# Patient Record
Sex: MALE | Race: WHITE | NOT HISPANIC OR LATINO | Employment: FULL TIME | URBAN - METROPOLITAN AREA
[De-identification: names, ages, dates, MRNs, and addresses within clinical notes are randomized per-mention and may not be internally consistent; named-entity substitution may affect disease eponyms.]

---

## 2017-11-22 ENCOUNTER — TRANSCRIBE ORDERS (OUTPATIENT)
Dept: NUTRITION | Facility: HOSPITAL | Age: 37
End: 2017-11-22

## 2017-11-22 DIAGNOSIS — E66.9 OBESITY, UNSPECIFIED CLASSIFICATION, UNSPECIFIED OBESITY TYPE, UNSPECIFIED WHETHER SERIOUS COMORBIDITY PRESENT: Primary | ICD-10-CM

## 2017-11-29 ENCOUNTER — HOSPITAL ENCOUNTER (OUTPATIENT)
Dept: NUTRITION | Facility: HOSPITAL | Age: 37
Setting detail: RECURRING SERIES
Discharge: HOME | End: 2017-11-29

## 2017-11-29 VITALS — WEIGHT: 288.8 LBS | HEIGHT: 71 IN | BODY MASS INDEX: 40.43 KG/M2

## 2017-11-29 PROCEDURE — 97802 MEDICAL NUTRITION INDIV IN: CPT

## 2018-01-02 ENCOUNTER — TELEPHONE (OUTPATIENT)
Dept: NUTRITION | Facility: HOSPITAL | Age: 38
End: 2018-01-02

## 2018-01-02 NOTE — PROGRESS NOTES
"Spoke with patient over the phone on progress since initial nutrition appointment approx. 1 month ago. Patient states he is doing better but not losing weight like he wants to. Says he has maintained his weight and states he is happy he did not gain weight during the holidays. Weight from initial appt is 288.8 pounds. Patient describes success with watching his calories and looking at the serving portions on nutrition labels. Patient states he has also been eating more at breakfast to help spread out his calories over the day. Patient is currently looking into a gym membership to help see more weight loss. Patient does not have any questions for RD today. Will call back when he is ready to schedule face-to-face follow up.     Goal completion:  1. Spread calories more evenly throughout the day by doing meal prep for breakfast and lunch, at least 3 days per week: 100%  2. Lose weight - .5# /2# per week: 0% (self report \"maintained\")    Patient has contact information and is encouraged to call as needed. Thank you for this referral.   "

## 2019-12-12 ENCOUNTER — HOSPITAL ENCOUNTER (OUTPATIENT)
Dept: GENERAL RADIOLOGY | Facility: HOSPITAL | Age: 39
Discharge: HOME | End: 2019-12-12
Admitting: INTERNAL MEDICINE

## 2019-12-12 ENCOUNTER — TRANSCRIBE ORDERS (OUTPATIENT)
Dept: ADMINISTRATIVE | Facility: HOSPITAL | Age: 39
End: 2019-12-12

## 2019-12-12 DIAGNOSIS — M54.50 LUMBOSACRAL PAIN: Primary | ICD-10-CM

## 2019-12-12 DIAGNOSIS — M54.50 LUMBOSACRAL PAIN: ICD-10-CM

## 2019-12-12 PROCEDURE — 72114 X-RAY EXAM L-S SPINE BENDING: CPT
